# Patient Record
Sex: FEMALE | URBAN - METROPOLITAN AREA
[De-identification: names, ages, dates, MRNs, and addresses within clinical notes are randomized per-mention and may not be internally consistent; named-entity substitution may affect disease eponyms.]

---

## 2023-03-27 ENCOUNTER — OFFICE VISIT (OUTPATIENT)
Dept: PRIMARY CARE | Facility: CLINIC | Age: 53
End: 2023-03-27
Payer: COMMERCIAL

## 2023-03-27 VITALS
HEIGHT: 66 IN | DIASTOLIC BLOOD PRESSURE: 86 MMHG | BODY MASS INDEX: 36.8 KG/M2 | SYSTOLIC BLOOD PRESSURE: 132 MMHG | HEART RATE: 78 BPM | WEIGHT: 229 LBS

## 2023-03-27 DIAGNOSIS — N30.01 ACUTE CYSTITIS WITH HEMATURIA: Primary | ICD-10-CM

## 2023-03-27 DIAGNOSIS — R31.9 HEMATURIA, UNSPECIFIED TYPE: ICD-10-CM

## 2023-03-27 DIAGNOSIS — R35.0 URINARY FREQUENCY: ICD-10-CM

## 2023-03-27 PROBLEM — M25.571 ANKLE PAIN, RIGHT: Status: ACTIVE | Noted: 2023-03-27

## 2023-03-27 PROBLEM — L72.3 INFECTED SEBACEOUS CYST: Status: ACTIVE | Noted: 2023-03-27

## 2023-03-27 PROBLEM — L08.9 INFECTED SEBACEOUS CYST: Status: ACTIVE | Noted: 2023-03-27

## 2023-03-27 LAB
POC APPEARANCE, URINE: CLEAR
POC BILIRUBIN, URINE: NEGATIVE
POC BLOOD, URINE: ABNORMAL
POC COLOR, URINE: YELLOW
POC GLUCOSE, URINE: NEGATIVE MG/DL
POC KETONES, URINE: NEGATIVE MG/DL
POC LEUKOCYTES, URINE: NEGATIVE
POC NITRITE,URINE: NEGATIVE
POC PH, URINE: 6.5 PH
POC PROTEIN, URINE: NEGATIVE MG/DL
POC SPECIFIC GRAVITY, URINE: <=1.005
POC UROBILINOGEN, URINE: 1 EU/DL

## 2023-03-27 PROCEDURE — 99213 OFFICE O/P EST LOW 20 MIN: CPT | Performed by: STUDENT IN AN ORGANIZED HEALTH CARE EDUCATION/TRAINING PROGRAM

## 2023-03-27 PROCEDURE — 87086 URINE CULTURE/COLONY COUNT: CPT

## 2023-03-27 PROCEDURE — 81002 URINALYSIS NONAUTO W/O SCOPE: CPT | Performed by: STUDENT IN AN ORGANIZED HEALTH CARE EDUCATION/TRAINING PROGRAM

## 2023-03-27 PROCEDURE — 1036F TOBACCO NON-USER: CPT | Performed by: STUDENT IN AN ORGANIZED HEALTH CARE EDUCATION/TRAINING PROGRAM

## 2023-03-27 RX ORDER — NITROFURANTOIN 25; 75 MG/1; MG/1
100 CAPSULE ORAL 2 TIMES DAILY
Qty: 14 CAPSULE | Refills: 0 | Status: SHIPPED | OUTPATIENT
Start: 2023-03-27 | End: 2023-04-03

## 2023-03-27 NOTE — PROGRESS NOTES
"Anita Thompson is a 52 y.o. year old female presenting for UTI       HPI:  Patient presenting with urinary frequency and incontinence for the last few months. She decided to come in when she noticed blood with urination. She does have some lower abdominal pain but no flank pain. She has had chills, and possible fever as well as some ongoing nausea. Her father just passed away from pancreatic cancer, so now she is concerned that something more serious could be happening.  ROS:   All pertinent positive symptoms are included in history of present illness.    All other systems have been reviewed and are negative and noncontributory to this patient's current ailments.    OBJECTIVE  Visit Vitals  /86   Pulse 78   Ht 1.676 m (5' 6\")   Wt 104 kg (229 lb)   BMI 36.96 kg/m²   Smoking Status Never   BSA 2.2 m²      Physical Exam:  GENERAL: Alert, oriented, pleasant, in no acute distress  HEENT: Head normocephalic, atraumatic  ABDOMEN: soft, +suprapubic tenderness, non-distended, no masses appreciated  EXTREMITIES: no edema, no cyanosis  PSYCH: Appropriate mood and affect  SKIN: No rashes or lesions appreciated    Assessment/Plan   Diagnoses and all orders for this visit:  Acute cystitis with hematuria  Hematuria, unspecified type  Urinary frequency  -     POCT UA (nonautomated) manually resulted - large blood present  -     START nitrofurantoin, macrocrystal-monohydrate, (Macrobid) 100 mg capsule; Take 1 capsule (100 mg) by mouth in the morning and 1 capsule (100 mg) before bedtime. Do all this for 7 days.  -     Urine Culture to be sent  -     Urinalysis with Reflex Microscopic - to be done in 2 weeks to check for resolution of the hematuria  - She is to let us know if her symptoms are worsening or not improving with the treatment               "

## 2023-03-28 LAB — URINE CULTURE: NORMAL

## 2023-03-29 ENCOUNTER — OFFICE VISIT (OUTPATIENT)
Dept: PRIMARY CARE | Facility: CLINIC | Age: 53
End: 2023-03-29
Payer: COMMERCIAL

## 2023-03-29 VITALS
SYSTOLIC BLOOD PRESSURE: 136 MMHG | DIASTOLIC BLOOD PRESSURE: 84 MMHG | WEIGHT: 229 LBS | HEIGHT: 66 IN | HEART RATE: 72 BPM | BODY MASS INDEX: 36.8 KG/M2

## 2023-03-29 DIAGNOSIS — R10.11 RUQ PAIN: ICD-10-CM

## 2023-03-29 DIAGNOSIS — Z00.00 ENCOUNTER FOR PREVENTIVE HEALTH EXAMINATION: Primary | ICD-10-CM

## 2023-03-29 PROCEDURE — 99396 PREV VISIT EST AGE 40-64: CPT | Performed by: STUDENT IN AN ORGANIZED HEALTH CARE EDUCATION/TRAINING PROGRAM

## 2023-03-29 PROCEDURE — 99214 OFFICE O/P EST MOD 30 MIN: CPT | Performed by: STUDENT IN AN ORGANIZED HEALTH CARE EDUCATION/TRAINING PROGRAM

## 2023-03-29 PROCEDURE — 1036F TOBACCO NON-USER: CPT | Performed by: STUDENT IN AN ORGANIZED HEALTH CARE EDUCATION/TRAINING PROGRAM

## 2023-03-29 NOTE — PROGRESS NOTES
Subjective   Patient ID: Anita Thompson is a 52 y.o. female who presents for Annual Exam.  HPI  53 yo Female presents to establish care.  Past medical history currently in EHR. Further updates pending ongoing discussions with patient regarding preferences to limit carried-over labelling.   Of note, pt had bloodwork done through previous PCP 02/14/2023, with no significant findings.  Of note, pt with visit 2 days prior to encounter for hematuria and ongoing abdominal discomfort, currently on day 4 of antibiotics.  Concerns this visit:    #Establish Care  Past Medical History:  . n/a medical   . (+)psychiatric   -- [facility], Dr. Gaspar: MDD, PTSD    Dr Sales - phone [phone] ext [phone]. Dona Zaragoza is the RN    Health Maintenance:  Labs recently done through previous provider - Dr. Breaux, [facility]  02/14/2023: CBC+diff WNL, CMP Ca2+ upper limit of reference 10.4 mg/dL, Lipids WNL, TSH WNL, HBsAg negative  Screenings:  . Cervical Cancer/ Pap - last done 03/06/2020 - negative for intraepithelial lesion or malignancy; high-risk HPV negative; DUE  . Breast Cancer/ Mammography (50-75 yo q2 years) - MM 08/16/2022, [place]  . Colon Cancer- 11/10/2021 Cologuard Negative; due after 11/10/2024  Vaccinations/Immunizations:  . Per brief EHR review,  vacc record complete  . Covid19 + booster - done  . Last Tdap  . Shingrix 02/14/2023    #On-going intermittent abdominal discomfort, including RUQ;//#Hematuria  Currently on day 4 of antibiotics, UA + RBCs, pending culture; denies any improvement or worsening of symptoms. Denies any known association with particular foods or time of day. Denies any loss of color of stools. Denies any change in abdominal discomfort related to defecation.  Denies any new or newly changed CP, SOB, dizziness, N/V/F/C or change in bowel habits.  Due/incomplete:  Concerns to address in future:    All other screenings and immunization/vaccinations up-to-date at  "this time.    ROS: All systems were reviewed and negative except as mentioned above in HPI.    Review of Systems  ROS: Denies any new onset/changing chest pain, palpitations, shortness of breath, fever, chills, nausea, vomiting, change in bowel or urinary habits.     Objective   Vitals:    03/29/23 0808   BP: 136/84   Pulse: 72   Weight: 104 kg (229 lb)   Height: 1.676 m (5' 6\")       Physical Exam  Physical Exam:  General: Well developed, no acute distress  Head: NCAT, EOMI without injection bilaterally  Neuro: No gross deficit appreciated; oriented to self, location, time    *pt w/ pmhx including PTSD, with pt-acknowledged reactive episode triggered in setting of physician encounter and history-gathering. Reassurance provided, but further exam and discussion deferred to attending to optimize pt comfort within encounter period.    *physical exam, assessment and plan further established with attending.  Denies any active SI/HI    Assessment and Plan:    #Establish Care/#Health Maintenance  Will continue to update EHR at future visits  [facility] for [facility].   Specifically declines any referrals for counseling/behavioral/mental health services through  at this time.  Labs benign.  Pap due    #On-going intermittent abdominal discomfort, including RUQ;//#Hematuria  Continue antibiotic course through completion  Encourage hydration  Monitor symptoms for improvement, worsening or any newly noted features  CMP from 02/14/2023 no e/o etiology affecting renal function.   Given RUQ discomfort, will assess GB.   RUQ Ultrasound - ordered  Pending results, may modify management as appropriate.  If no improvement or worsening by next Monday, 04/03, will modify evaluation/management as appropriate.    RTC for abdominal discomfort follow-up and/or updated HM as appropriate.    Dory Valdes,   Family Medicine, PGY1           Assessment/Plan     "

## 2023-03-31 ENCOUNTER — PATIENT MESSAGE (OUTPATIENT)
Dept: PRIMARY CARE | Facility: CLINIC | Age: 53
End: 2023-03-31
Payer: COMMERCIAL

## 2023-03-31 DIAGNOSIS — R10.84 GENERALIZED ABDOMINAL PAIN: Primary | ICD-10-CM

## 2023-03-31 DIAGNOSIS — R10.11 RUQ PAIN: ICD-10-CM

## 2023-03-31 NOTE — RESULT ENCOUNTER NOTE
CT abdomen and pelvis was completely normal with no source of her pain identified. She does have an enlarged uterus and should follow up with gyn if she has had issues. She can cancel her ultrasound that she scheduled because her gallbladder had no stones in it that would cause pain. I'm thinking maybe she has irritable bowel syndrome. She should try a bland diet for a little while, fiber supplement and a low FODMAP diet going forward. I can always have her see GI and see if they have any other suggestions for her pain.

## 2023-04-03 ENCOUNTER — TELEPHONE (OUTPATIENT)
Dept: PRIMARY CARE | Facility: CLINIC | Age: 53
End: 2023-04-03
Payer: COMMERCIAL

## 2023-04-03 DIAGNOSIS — Z97.5 IUD (INTRAUTERINE DEVICE) IN PLACE: ICD-10-CM

## 2023-04-03 DIAGNOSIS — R10.84 GENERALIZED ABDOMINAL PAIN: Primary | ICD-10-CM

## 2023-04-10 ENCOUNTER — APPOINTMENT (OUTPATIENT)
Dept: PRIMARY CARE | Facility: CLINIC | Age: 53
End: 2023-04-10
Payer: COMMERCIAL

## 2025-06-11 DIAGNOSIS — Z12.12 SCREENING FOR COLORECTAL CANCER: ICD-10-CM

## 2025-06-11 DIAGNOSIS — Z12.11 SCREENING FOR COLORECTAL CANCER: ICD-10-CM
